# Patient Record
Sex: MALE | Race: WHITE | URBAN - METROPOLITAN AREA
[De-identification: names, ages, dates, MRNs, and addresses within clinical notes are randomized per-mention and may not be internally consistent; named-entity substitution may affect disease eponyms.]

---

## 2022-07-23 ENCOUNTER — OFFICE VISIT (OUTPATIENT)
Dept: URBAN - METROPOLITAN AREA CLINIC 71 | Facility: CLINIC | Age: 52
End: 2022-07-23
Payer: COMMERCIAL

## 2022-07-23 DIAGNOSIS — H52.4 PRESBYOPIA: ICD-10-CM

## 2022-07-23 DIAGNOSIS — H53.9 VISUAL DISTURBANCE: Primary | ICD-10-CM

## 2022-07-23 DIAGNOSIS — H35.033 HYPERTENSIVE RETINOPATHY, BILATERAL: ICD-10-CM

## 2022-07-23 DIAGNOSIS — H04.123 DRY EYE SYNDROME OF BILATERAL LACRIMAL GLANDS: ICD-10-CM

## 2022-07-23 PROCEDURE — 99203 OFFICE O/P NEW LOW 30 MIN: CPT

## 2022-07-23 ASSESSMENT — INTRAOCULAR PRESSURE
OD: 13
OS: 11

## 2022-07-23 NOTE — IMPRESSION/PLAN
Impression: Hypertensive retinopathy, bilateral: H35.033. Plan: Discussed DX and today's testing with pt. Educated/Stressed to pt on importance of BP control with monitoring, remaining compliant with medication regiment, and continuing follow up care with PCP regularly. Explained to pt that signs of Hypertension, such as AV nicking and tortuosity, will continue to be present but if BP does not stay under control/stable it can make condition worse. Recommend yearly DFE with [employer] for monitoring Pt voiced understanding

## 2022-07-23 NOTE — IMPRESSION/PLAN
Impression: Visual disturbance: H53.9. Plan: Discussed diagnosis in detail with patient. Will continue to observe condition and or symptoms.

## 2023-07-28 ENCOUNTER — OFFICE VISIT (OUTPATIENT)
Dept: URBAN - METROPOLITAN AREA CLINIC 71 | Facility: CLINIC | Age: 53
End: 2023-07-28
Payer: COMMERCIAL

## 2023-07-28 DIAGNOSIS — H04.123 DRY EYE SYNDROME OF BILATERAL LACRIMAL GLANDS: ICD-10-CM

## 2023-07-28 DIAGNOSIS — H35.033 HYPERTENSIVE RETINOPATHY, BILATERAL: Primary | ICD-10-CM

## 2023-07-28 PROCEDURE — 92133 CPTRZD OPH DX IMG PST SGM ON: CPT

## 2023-07-28 PROCEDURE — 99213 OFFICE O/P EST LOW 20 MIN: CPT

## 2023-07-28 PROCEDURE — 92134 CPTRZ OPH DX IMG PST SGM RTA: CPT

## 2023-07-28 ASSESSMENT — INTRAOCULAR PRESSURE
OD: 9
OS: 7